# Patient Record
Sex: FEMALE | Race: ASIAN | Employment: OTHER | URBAN - METROPOLITAN AREA
[De-identification: names, ages, dates, MRNs, and addresses within clinical notes are randomized per-mention and may not be internally consistent; named-entity substitution may affect disease eponyms.]

---

## 2018-12-17 ENCOUNTER — HOSPITAL ENCOUNTER (EMERGENCY)
Facility: HOSPITAL | Age: 73
Discharge: HOME | End: 2018-12-17
Payer: MEDICARE

## 2018-12-17 ENCOUNTER — APPOINTMENT (OUTPATIENT)
Dept: GENERAL RADIOLOGY | Facility: HOSPITAL | Age: 73
End: 2018-12-17
Attending: PHYSICIAN ASSISTANT
Payer: MEDICARE

## 2018-12-17 VITALS
HEIGHT: 63 IN | HEART RATE: 67 BPM | DIASTOLIC BLOOD PRESSURE: 76 MMHG | TEMPERATURE: 98 F | RESPIRATION RATE: 16 BRPM | SYSTOLIC BLOOD PRESSURE: 118 MMHG | OXYGEN SATURATION: 97 % | WEIGHT: 113 LBS | BODY MASS INDEX: 20.02 KG/M2

## 2018-12-17 DIAGNOSIS — S93.602A SPRAIN OF LEFT FOOT, INITIAL ENCOUNTER: Primary | ICD-10-CM

## 2018-12-17 PROCEDURE — 73630 X-RAY EXAM OF FOOT: CPT | Performed by: PHYSICIAN ASSISTANT

## 2018-12-17 PROCEDURE — 99283 EMERGENCY DEPT VISIT LOW MDM: CPT

## 2018-12-17 RX ORDER — IBUPROFEN 600 MG/1
600 TABLET ORAL EVERY 8 HOURS PRN
Qty: 30 TABLET | Refills: 0 | Status: SHIPPED | OUTPATIENT
Start: 2018-12-17 | End: 2018-12-24

## 2018-12-18 NOTE — ED PROVIDER NOTES
Patient Seen in: [facility] Emergency Department    History   Patient presents with:  Lower Extremity Injury (musculoskeletal)    Stated Complaint: left ankle injury    HPI    76-year-old female who comes in today complaining of left foot pain that resistance. Normal sensation. Normal cap refill. Normal dorsalis pedis and anterior tibial pulses.  Negative anterior drawer test. Neg squeeze test. Negative proximal fibula tenderness      ED Course   Labs Reviewed - No data to display       Xr Foot, Compl expectations, follow up, and return to the ER precautions. I explained to the patient that emergent conditions may arise to return to the immediate care or ER for new, worsening or any persistent conditions.   I've explained the importance of following up

## 2019-05-17 ENCOUNTER — HOSPITAL ENCOUNTER (OUTPATIENT)
Dept: PHYSICAL THERAPY | Facility: HOSPITAL | Age: 74
Setting detail: THERAPIES SERIES
Discharge: HOME | End: 2019-05-17
Attending: ORTHOPAEDIC SURGERY
Payer: MEDICARE

## 2019-05-17 DIAGNOSIS — M25.532 PAIN IN LEFT WRIST: ICD-10-CM

## 2019-05-17 PROCEDURE — 97162 PT EVAL MOD COMPLEX 30 MIN: CPT

## 2019-05-17 NOTE — PROGRESS NOTES
PHYSICAL THERAPY UPPER EXTREMITY EVALUATION   Referring Physician: Dr. Prabhjot Gunn  Diagnosis: Pain in left wrist (F49.373) S/P Left Wrist ORIF 5/3/2019     Date of Service: 5/17/2019     PATIENT SUMMARY   Blake Lowery is a 68year old y/o female who presen use her left upper extremity for cooking, cleaning, and dressing. Lucy Jesus is a good candidate for physical therapy.   Isiah Lakhani would benefit from skilled Physical Therapy to address the above impairments to improve range of motion, improve strength, and decreas for 2-3 x/week or a total of 8 visits over a 90 day period.   Treatment will include: Manual Therapy, Therapeutic Exercise, Therapeutic Activity, Ultrasound, Electrical Stim, Neuromuscular Re-education    Education or treatment limitation: None  Rehab Maurilio

## 2019-05-21 ENCOUNTER — HOSPITAL ENCOUNTER (OUTPATIENT)
Dept: PHYSICAL THERAPY | Facility: HOSPITAL | Age: 74
Setting detail: THERAPIES SERIES
Discharge: HOME | End: 2019-05-21
Attending: ORTHOPAEDIC SURGERY
Payer: MEDICARE

## 2019-05-21 PROCEDURE — 97140 MANUAL THERAPY 1/> REGIONS: CPT

## 2019-05-21 PROCEDURE — 97110 THERAPEUTIC EXERCISES: CPT

## 2019-05-21 NOTE — PROGRESS NOTES
Dx: Pain in left wrist (M25.532) S/P Left Wrist ORIF 5/3/2019          Insurance (Authorized # of Visits):  8 ([address] [facility] [address])          Authorizing Physician: Dr. Florinda Peralta  Next MD visit: none scheduled  Fall Risk: standard         Precautions: n/a             Subjec least 25 degrees of wrist flexion AROM on the left side to increase ease with lifting objects  4.  The patient will be independent and adherent in a comprehensive HEP    Plan: add placing round discs on pegs  Date: 5/21/2019  TX#: 2/8 Date:

## 2019-05-22 ENCOUNTER — APPOINTMENT (OUTPATIENT)
Dept: PHYSICAL THERAPY | Facility: HOSPITAL | Age: 74
End: 2019-05-22
Attending: ORTHOPAEDIC SURGERY
Payer: MEDICARE

## 2019-05-24 ENCOUNTER — HOSPITAL ENCOUNTER (OUTPATIENT)
Dept: PHYSICAL THERAPY | Facility: HOSPITAL | Age: 74
Setting detail: THERAPIES SERIES
Discharge: HOME | End: 2019-05-24
Attending: ORTHOPAEDIC SURGERY
Payer: MEDICARE

## 2019-05-24 PROCEDURE — 97140 MANUAL THERAPY 1/> REGIONS: CPT

## 2019-05-24 PROCEDURE — 97110 THERAPEUTIC EXERCISES: CPT

## 2019-05-24 NOTE — PROGRESS NOTES
Dx: Pain in left wrist (M25.532) S/P Left Wrist ORIF 5/3/2019          Insurance (Authorized # of Visits):  8 ([address] [facility] [address])          Authorizing Physician: Dr. Ian Velazquez  Next MD visit: none scheduled  Fall Risk: standard         Precautions: n/a             Subjec wrist flexion/extension  Date: 5/21/2019  TX#: 2/8 Date: 5/24/2019             TX#: 3/8 Date:                 TX#: 4/ Date:                 TX#: 5/ Date:    Tx#: 6/   Manual Therapy:  STM to forearm musculature for pain reduction (3 min)  Gentle radial head

## 2019-05-28 ENCOUNTER — HOSPITAL ENCOUNTER (OUTPATIENT)
Dept: PHYSICAL THERAPY | Facility: HOSPITAL | Age: 74
Setting detail: THERAPIES SERIES
Discharge: HOME | End: 2019-05-28
Attending: FAMILY MEDICINE
Payer: MEDICARE

## 2019-05-28 PROCEDURE — 97140 MANUAL THERAPY 1/> REGIONS: CPT

## 2019-05-28 PROCEDURE — 97110 THERAPEUTIC EXERCISES: CPT

## 2019-05-29 NOTE — PROGRESS NOTES
Dx: Pain in left wrist (M25.532) S/P Left Wrist ORIF 5/3/2019          Insurance (Authorized # of Visits):  8 ([address])          Authorizing Physician: Dr. Ernesto Salinas MD visit: none scheduled  Fall Risk: standard         Precautions: n/a             Subject flex bar strengthening  Date: 5/21/2019  TX#: 2/8 Date: 5/24/2019             TX#: 3/8 Date: 5/28/2019             TX#: 4/8 Date:                 TX#: 5/ Date:    Tx#: 6/   Manual Therapy:  STM to forearm musculature for pain reduction (3 min)  Gentle radia

## 2019-05-30 ENCOUNTER — HOSPITAL ENCOUNTER (OUTPATIENT)
Dept: PHYSICAL THERAPY | Facility: HOSPITAL | Age: 74
Setting detail: THERAPIES SERIES
Discharge: HOME | End: 2019-05-30
Attending: FAMILY MEDICINE
Payer: MEDICARE

## 2019-05-30 PROCEDURE — 97140 MANUAL THERAPY 1/> REGIONS: CPT

## 2019-05-30 PROCEDURE — 97110 THERAPEUTIC EXERCISES: CPT

## 2019-05-30 NOTE — PROGRESS NOTES
Dx: Pain in left wrist (M25.532) S/P Left Wrist ORIF 5/3/2019          Insurance (Authorized # of Visits):  8 ([address] [facility] [address])          Authorizing Physician: Dr. Mayra Chatman Next MD visit: none scheduled  Fall Risk: standard         Precautions: n/a             Subject increase ease with lifting objects  4.  The patient will be independent and adherent in a comprehensive HEP    Plan: perform clip pinching  Date: 5/21/2019  TX#: 2/8 Date: 5/24/2019             TX#: 3/8 Date: 5/28/2019             TX#: 4/8 Date:  5/30/2019 flexion/extension 10x  Making fist and finger extension 10x  Picking up small metal pegs and putting in holes 6x with L UE  Placing colorful pegs in holes 2x5 on L  Squeezing yellow putty to improve  strength 10x  Pressing yellow putty with fingers to

## 2019-06-04 ENCOUNTER — HOSPITAL ENCOUNTER (OUTPATIENT)
Dept: PHYSICAL THERAPY | Facility: HOSPITAL | Age: 74
Setting detail: THERAPIES SERIES
Discharge: HOME | End: 2019-06-04
Attending: FAMILY MEDICINE
Payer: MEDICARE

## 2019-06-04 PROCEDURE — 97140 MANUAL THERAPY 1/> REGIONS: CPT

## 2019-06-04 PROCEDURE — 97110 THERAPEUTIC EXERCISES: CPT

## 2019-06-04 NOTE — PROGRESS NOTES
Dx: Pain in left wrist (M25.532) S/P Left Wrist ORIF 5/3/2019          Insurance (Authorized # of Visits):  8 ([address])          Authorizing Physician: Dr. Slick Funez Next MD visit: none scheduled  Fall Risk: standard         Precautions: n/a             Subject and adherent in a comprehensive HEP    Plan: perform reassessment  Date: 5/21/2019  TX#: 2/8 Date: 5/24/2019             TX#: 3/8 Date: 5/28/2019             TX#: 4/8 Date:  5/30/2019            TX#: 5/8 Date: 6/4/2019  Tx#: 6/8   Manual Therapy:  JOHNSON to deny pegs)  Wrist slides to improve flexion ROM 10x  Picking coins up and placing in slot, 10x (added to HEP) There ex:   Thumb flexion/extension 10x  Making fist and finger extension 10x  Picking up small metal pegs and putting in holes 6x with L UE  Placing c

## 2019-06-06 ENCOUNTER — HOSPITAL ENCOUNTER (OUTPATIENT)
Dept: PHYSICAL THERAPY | Facility: HOSPITAL | Age: 74
Setting detail: THERAPIES SERIES
Discharge: HOME | End: 2019-06-06
Attending: FAMILY MEDICINE
Payer: MEDICARE

## 2019-06-06 PROCEDURE — 97110 THERAPEUTIC EXERCISES: CPT

## 2019-06-06 NOTE — PROGRESS NOTES
Dx: Pain in left wrist (M25.532) S/P Left Wrist ORIF 5/3/2019          Insurance (Authorized # of Visits):  8 ([address])          Authorizing Physician: Dr. Nalini Ware Next MD visit: none scheduled  Fall Risk: standard         Precautions: n/a              Tha Keyes her surgeon after her trip, and if further physical therapy is warranted, the patient will need a new physical therapy order. Goals: To be met in 8 visits  1. The patient will report being able to dress herself while using her left upper extremity.  MET Therapy:  Gentle radial head mobilizations grade II to improve pronation/supination ROM (2 min)  Wrist PROM into flexion/extension (3 min)  Thumb flexion/extension PROM (3 min) Manual Therapy:  Gentle radial head mobilizations grade II to improve pronation 10x ea  Finger extension with yellow rubber band, 5x  Placing small pegs in small holes, 20 on L  Wrist extension/flexion against gravity with no resistance 2x10  Ulnar/radial deviation 10x ea    X X X X X X   HEP: active range of motion of fingers, forear

## 2019-08-29 ENCOUNTER — ORDER TRANSCRIPTION (OUTPATIENT)
Dept: PHYSICAL THERAPY | Facility: HOSPITAL | Age: 74
End: 2019-08-29

## 2019-08-29 DIAGNOSIS — M25.532 LEFT WRIST PAIN: Primary | ICD-10-CM

## 2019-09-03 NOTE — PROGRESS NOTES
ELBOW AND HAND EVALUATION:   Referring Physician: Ms. Thomas Husbands  Diagnosis: Left wrist pain (M25.532)        Date of Service: 9/3/2019     PATIENT SUMMARY   Marely Manning is a 68year old female who presents to therapy today with complaints of to 50% compared to contralateral side. Functional deficits include but are not limited to cooking, cleaning, self care management of hair. Signs and symptoms are consistent with referring diagnosis.  Pt and PT discussed evaluation findings, pathology, POC education , detrimental fear avoidance behaviors and importance of remaining active. Discussed use of ice if soreness following increased activity. Discussed use of self massage to region of pain as long as continued relief.  Discussed continued use of spo your referral. Please co-sign or sign and return this letter via fax as soon as possible to [phone].  If you have any questions, please contact me at Dept: [phone]    Sincerely,  Electronically signed by therapist: Jose Francisco Teran PT  Physician's cer

## 2019-09-04 ENCOUNTER — HOSPITAL ENCOUNTER (OUTPATIENT)
Dept: PHYSICAL THERAPY | Facility: HOSPITAL | Age: 74
Setting detail: THERAPIES SERIES
Discharge: HOME | End: 2019-09-04
Payer: MEDICARE

## 2019-09-04 DIAGNOSIS — M25.532 LEFT WRIST PAIN: ICD-10-CM

## 2019-09-04 PROCEDURE — 97162 PT EVAL MOD COMPLEX 30 MIN: CPT

## 2019-09-06 ENCOUNTER — HOSPITAL ENCOUNTER (OUTPATIENT)
Dept: PHYSICAL THERAPY | Facility: HOSPITAL | Age: 74
Setting detail: THERAPIES SERIES
Discharge: HOME | End: 2019-09-06
Payer: MEDICARE

## 2019-09-06 PROCEDURE — 97110 THERAPEUTIC EXERCISES: CPT

## 2019-09-06 PROCEDURE — 97140 MANUAL THERAPY 1/> REGIONS: CPT

## 2019-09-06 NOTE — PROGRESS NOTES
Dx: Left wrist pain (M25.532)            Insurance (Authorized # of Visits): Medicare 8          Authorizing Physician: Dr. Cindy Ly  Next MD visit: none scheduled  Fall Risk: standard         Precautions: n/a             Subjective: Reports mild pain at score to 60/100 or greater to demonstrate overall improvement in function  · Patient will be independent and compliant with HEP      Plan: progress wrist ROM into normal range, progress wrist and  strengths; all per original POC  Date: 9/5/2019  TX#: 2

## 2019-09-09 ENCOUNTER — HOSPITAL ENCOUNTER (OUTPATIENT)
Dept: PHYSICAL THERAPY | Facility: HOSPITAL | Age: 74
Setting detail: THERAPIES SERIES
Discharge: HOME | End: 2019-09-09
Payer: MEDICARE

## 2019-09-09 PROCEDURE — 97110 THERAPEUTIC EXERCISES: CPT

## 2019-09-09 PROCEDURE — 97140 MANUAL THERAPY 1/> REGIONS: CPT

## 2019-09-09 NOTE — PROGRESS NOTES
Dx: Left wrist pain (M25.532)            Insurance (Authorized # of Visits):  Medicare 8           Authorizing Physician: Dr. Nasima Beckman  Next MD visit: none scheduled  Fall Risk: standard         Precautions: n/a             Subjective: No particular exercis will improve FOTO score to 60/100 or greater to demonstrate overall improvement in function  · Patient will be independent and compliant with HEP      Plan: progress wrist ROM into normal range, progress wrist and  strengths; all per original POC; putt

## 2019-09-11 ENCOUNTER — HOSPITAL ENCOUNTER (OUTPATIENT)
Dept: PHYSICAL THERAPY | Facility: HOSPITAL | Age: 74
Setting detail: THERAPIES SERIES
Discharge: HOME | End: 2019-09-11
Payer: MEDICARE

## 2019-09-11 PROCEDURE — 97110 THERAPEUTIC EXERCISES: CPT

## 2019-09-11 PROCEDURE — 97140 MANUAL THERAPY 1/> REGIONS: CPT

## 2019-09-11 NOTE — PROGRESS NOTES
Dx: Left wrist pain (M25.532)            Insurance (Authorized # of Visits): Medicare 8          Authorizing Physician: Dr. Nichole Vasquez  Next MD visit: none scheduled  Fall Risk: standard         Precautions: n/a             Subjective: Was doing some work and flexion AROM, ext AROM; lateral pinch 2s x 10; 5s sponge x 10, pron/sup with small hammer 2 x 5 ea    Goals:  (to be met in 10 visits)   · Patient will improve wrist flexion to 70 to improve function with grace murphy (improving)  · Patient will impro placement ring for pinch  x yellow, green and red  -flex bar flexion x 10 x 2, ext x 10 x 2 red thin  - squeeze on stress ball 5s x 10 x 2, 5s rest TE:  -rad dev AROM 1# holds x 10 x 2  -flex AROM 1# x 10 x 2  -pronation/supination hammer 8 ea x 2

## 2019-09-16 ENCOUNTER — HOSPITAL ENCOUNTER (OUTPATIENT)
Dept: PHYSICAL THERAPY | Facility: HOSPITAL | Age: 74
Setting detail: THERAPIES SERIES
Discharge: HOME | End: 2019-09-16
Payer: MEDICARE

## 2019-09-16 PROCEDURE — 97110 THERAPEUTIC EXERCISES: CPT

## 2019-09-16 PROCEDURE — 97140 MANUAL THERAPY 1/> REGIONS: CPT

## 2019-09-16 NOTE — PROGRESS NOTES
Dx: Left wrist pain (M25.532)            Insurance (Authorized # of Visits):  Medicare 8            Authorizing Physician: Dr. Lenore Yates  Next MD visit: none scheduled  Fall Risk: standard         Precautions: n/a             Subjective: Reports feeling sore Instructed in and issued handouts for: 5s x 10 radial deviation AROM, flexion AROM, ext AROM; lateral pinch 2s x 10; 5s sponge x 10, pron/sup with small hammer 2 x 5 ea    Goals:  (to be met in 10 visits)   · Patient will improve wrist flexion to 70 to ea x 2  -sponge lateral pinch 2s x 10  -sponge squeeze 5s x 10  -clothespin placement ring for pinch  x green and red TE:  -rad dev AROM 1# holds x 10 x 2  -standing rad dev 1# x 12 x 2  -flex AROM 1# x 10 x 2  -ext AROM 1#  10 x 2  -pronation/supinati

## 2019-09-18 ENCOUNTER — HOSPITAL ENCOUNTER (OUTPATIENT)
Dept: PHYSICAL THERAPY | Facility: HOSPITAL | Age: 74
Setting detail: THERAPIES SERIES
Discharge: HOME | End: 2019-09-18
Payer: MEDICARE

## 2019-09-18 PROCEDURE — 97140 MANUAL THERAPY 1/> REGIONS: CPT

## 2019-09-18 PROCEDURE — 97110 THERAPEUTIC EXERCISES: CPT

## 2019-09-18 NOTE — PROGRESS NOTES
Dx: Left wrist pain (M25.532)            Insurance (Authorized # of Visits):  Medicare 8             Authorizing Physician: Dr. Chiqui Ayala Next MD visit: none scheduled  Fall Risk: standard         Precautions: n/a             Subjective: Feeling okay today  strength devices into session with good tolerance. Discussion and demo with pictures for lateral pinch  with putty for home.  Mar Parent would benefit from continued PT to address continued limitations in wrist strength and ROM as well as  and pi with slight distraction   -ulnar carpal glide with movement into radial deviation x 10 G3 Manual:   -STM wrist extensors with PROM flexion  -dorsal glide for wrist flexion   -PROM flexion/ext x 10 ea with slight distraction   -ulnar carpal glide with movem ea  -scoop rice into measuring cup x 1 liter x 2  -wrist roll on velcro with pinch  x 3 ea way  -wrist roll small cylinder x 3 ea way  -flex bar flexion x 10 x 2, ext x 10 x 2 red thin              Charges: Manual x 1 (12'); TE x 3 (43')       Total Ti

## 2019-09-19 ENCOUNTER — TELEPHONE (OUTPATIENT)
Dept: PHYSICAL THERAPY | Facility: HOSPITAL | Age: 74
End: 2019-09-19

## 2019-09-23 ENCOUNTER — HOSPITAL ENCOUNTER (OUTPATIENT)
Dept: PHYSICAL THERAPY | Facility: HOSPITAL | Age: 74
Setting detail: THERAPIES SERIES
Discharge: HOME | End: 2019-09-23
Payer: MEDICARE

## 2019-09-23 PROCEDURE — 97110 THERAPEUTIC EXERCISES: CPT

## 2019-09-23 PROCEDURE — 97140 MANUAL THERAPY 1/> REGIONS: CPT

## 2019-09-23 NOTE — PROGRESS NOTES
Dx: Left wrist pain (M25.532)            Insurance (Authorized # of Visits): Medicare 8            Authorizing Physician: Dr. Hossein Hamilton Next MD visit: October 3rd, 2019  Fall Risk: standard         Precautions: n/a             Subjective: Feeling good toda with attention to task.      Instructed in and issued handouts for: 5s x 10 radial deviation AROM, flexion AROM, ext AROM (all AROM upgraded to holding can soda or soup can); lateral pinch 2s x 10; 5s sponge x 10, pron/sup with small hammer 2 x 5 ea; Mehul Mahajan x 10 G3 Manual:   -dorsal glide for wrist flexion   -PROM flexion/ext x 10 ea with slight distraction   -ulnar carpal glide with movement into radial deviation x 10 G3 Manual:   -dorsal glide for wrist flexion   -PROM flexion x 10 ea with slight distractio cylinder x 3 ea way  -flex bar flexion x 10 x 2, ext x 10 x 2 red thin TE:   -ROM re-assessment  -wrist flexion 2# 2 x 12  -radial deviation at side hammer 2 x 12  -pron/sup small hammer x 10 ea way x 2  -5#  tool 12x 3s holds  -6#  x 15 x 2  -clot

## 2019-09-25 ENCOUNTER — HOSPITAL ENCOUNTER (OUTPATIENT)
Dept: PHYSICAL THERAPY | Facility: HOSPITAL | Age: 74
Setting detail: THERAPIES SERIES
Discharge: HOME | End: 2019-09-25
Payer: MEDICARE

## 2019-09-25 PROCEDURE — 97110 THERAPEUTIC EXERCISES: CPT

## 2019-09-25 PROCEDURE — 97140 MANUAL THERAPY 1/> REGIONS: CPT

## 2019-09-25 NOTE — PROGRESS NOTES
Dx: Left wrist pain (M25.532)            Insurance (Authorized # of Visits):  Medicare 8             Authorizing Physician: Dr. Nalini Ware Next MD visit: October 3rd, 2019  Fall Risk: standard         Precautions: n/a             Subjective: Wrist is feelin with twisting out water from towel, lifting pots and pans with items in them.      Instructed in and issued handouts for: 5s x 10 radial deviation AROM, flexion AROM, ext AROM (all AROM upgraded to holding can soda or soup can); lateral pinch 2s x 10; 5s sp deviation x 10 G3 Manual:   -dorsal glide for wrist flexion   -PROM flexion x 10 ea with slight distraction   -ulnar carpal glide with movement into radial deviation x 10 G3 Manual:   -dorsal glide for wrist flexion   -PROM flexion x 10 ea with slight dist holds  -6#  x 15 x 2  -clothespin placement for pinch  green, red, blue (1st and second digit )  -scoop rice into measuring cup x 1 liter x 2  -wrist roll on velcro with pinch  x 3 ea way  -wrist roll small cylinder x 1ea way   TE:   -cloth

## 2019-09-30 ENCOUNTER — HOSPITAL ENCOUNTER (OUTPATIENT)
Dept: PHYSICAL THERAPY | Facility: HOSPITAL | Age: 74
Setting detail: THERAPIES SERIES
Discharge: HOME | End: 2019-09-30
Payer: MEDICARE

## 2019-09-30 PROCEDURE — 97110 THERAPEUTIC EXERCISES: CPT

## 2019-09-30 PROCEDURE — 97140 MANUAL THERAPY 1/> REGIONS: CPT

## 2019-09-30 NOTE — ADDENDUM NOTE
Encounter addended by: Rosaline Cardoso PT on: 9/30/2019 12:18 PM   Actions taken: Sign clinical note

## 2019-09-30 NOTE — ADDENDUM NOTE
Encounter addended by: Carlene Elizondo PT on: 9/30/2019 12:15 PM   Actions taken: Sign clinical note

## 2019-09-30 NOTE — ADDENDUM NOTE
Encounter addended by: Carlene Elizondo PT on: 9/30/2019 12:17 PM   Actions taken: Sign clinical note

## 2019-09-30 NOTE — ADDENDUM NOTE
Encounter addended by: Matilde Galindo PT on: 9/30/2019 12:14 PM   Actions taken: Sign clinical note

## 2019-10-02 ENCOUNTER — HOSPITAL ENCOUNTER (OUTPATIENT)
Dept: PHYSICAL THERAPY | Facility: HOSPITAL | Age: 74
Setting detail: THERAPIES SERIES
Discharge: HOME | End: 2019-10-02
Payer: MEDICARE

## 2019-10-02 ENCOUNTER — TELEPHONE (OUTPATIENT)
Dept: PHYSICAL THERAPY | Facility: HOSPITAL | Age: 74
End: 2019-10-02

## 2019-10-02 PROCEDURE — 97140 MANUAL THERAPY 1/> REGIONS: CPT

## 2019-10-02 PROCEDURE — 97110 THERAPEUTIC EXERCISES: CPT

## 2019-10-02 NOTE — PROGRESS NOTES
Dx: Left wrist pain (M25.532)            Insurance (Authorized # of Visits):  Medicare 10       (6 additional requested)   Authorizing Physician: Dr. Parag Craig Next MD visit: October 3rd, 2019  Fall Risk: standard         Precautions: n/a           Pt ha 5/5; L 4*/5       Assessment: Improvement to radial deviation noted. No change since last measured and still slightly limited wrist flexion to 50.  PROM flexion into gentle stretch by patient performed, reports pain at distal wrist palmar side, cues for gen movement into radial deviation x 10 G3 Manual:   -dorsal glide for wrist flexion   -PROM flexion/ext x 10 ea with slight distraction   -ulnar carpal glide with movement into radial deviation x 10 G3 Manual:   -dorsal glide for wrist flexion   -PROM flexion )  -scoop rice into measuring cup x 1 liter x 2  -wrist roll on velcro with pinch  x 3 ea way  -wrist roll small cylinder x 1ea way   TE:   -clothespin placement for pinch  green x 2, blue x 2  -radial deviation at side hammer 3 x 10  -pron/sup

## 2019-10-02 NOTE — TELEPHONE ENCOUNTER
Left message with  requesting signed POC by provider for PT evaluation and progress note for medicare sent on 9/4 and 9/30.

## 2019-10-04 ENCOUNTER — TELEPHONE (OUTPATIENT)
Dept: PHYSICAL THERAPY | Facility: HOSPITAL | Age: 74
End: 2019-10-04

## 2019-10-04 NOTE — TELEPHONE ENCOUNTER
Called to confirm reciept of re-send fax for signed evaluation for medicare. Requested sent to alternate fax [phone]. Re-sent fax.

## 2019-10-07 ENCOUNTER — HOSPITAL ENCOUNTER (OUTPATIENT)
Dept: PHYSICAL THERAPY | Facility: HOSPITAL | Age: 74
Setting detail: THERAPIES SERIES
Discharge: HOME | End: 2019-10-07
Attending: ORTHOPAEDIC SURGERY
Payer: MEDICARE

## 2019-10-07 PROCEDURE — 97140 MANUAL THERAPY 1/> REGIONS: CPT

## 2019-10-07 PROCEDURE — 97110 THERAPEUTIC EXERCISES: CPT

## 2019-10-07 NOTE — PROGRESS NOTES
Dx: Left wrist pain (M25.532)            Insurance (Authorized # of Visits):  Medicare 12  Authorizing Physician: Dr. Prabhjot Salinas MD visit: early November 2019  Fall Risk: standard         Precautions: n/a             Subjective: Saw surgeon last week wrist flexion, supin/pron hammer, putty squeeze, key pinch, wall push up  Access Code: [email or web address] ([email or web address])    Goals:  (to be met in 10 visits)   · Patient will improve wrist flexion to 60 (downgraded to WNL due to progress) to improve function with donnin glide for wrist flexion   -PROM flexion x 10 ea with slight distraction   -ulnar carpal glide with movement into radial deviation x 10 G3  -STM extensors of wrist, ext and add hallicus end of session   TE:   -re-assessment  strength  -wrist extension 2 10  -RTB wrist flexion x 10  -ROM re-assessment TE:   UBE 2 fwd/2 retro, 1.5 level  -wall push up 2 x 12  -wrist ABCs 1# ball in radial deviation position  -red flex bar 3 x 8 radial deviation  - squeeze green band 6 x 3  --clothespin placement for pin

## 2019-10-14 ENCOUNTER — HOSPITAL ENCOUNTER (OUTPATIENT)
Dept: PHYSICAL THERAPY | Facility: HOSPITAL | Age: 74
Setting detail: THERAPIES SERIES
Discharge: HOME | End: 2019-10-14
Attending: FAMILY MEDICINE
Payer: MEDICARE

## 2019-10-14 PROCEDURE — 97140 MANUAL THERAPY 1/> REGIONS: CPT

## 2019-10-14 PROCEDURE — 97110 THERAPEUTIC EXERCISES: CPT

## 2019-10-14 NOTE — PROGRESS NOTES
Dx: Left wrist pain (M25.532)            Insurance (Authorized # of Visits):  Medicare 12  Authorizing Physician: Dr. Freddie Salinas MD visit: early November 2019  Fall Risk: standard         Precautions: n/a             Subjective: Reports exercise comp third digit assist. Will continue to progress within tolerance.        Instructed in and issued handouts for: RTB radial deviation, RTB wrist flexion, supin/pron hammer, putty squeeze, key pinch, couch push up  Access Code: Marianela Hays ([email or web address])    Goals:  ( -PROM flexion x 10 ea with slight distraction   -ulnar carpal glide with movement into radial deviation x 10 G3  -STM extensors of wrist, ext and add hallicus end of session Manual:   -dorsal glide for wrist flexion   -PROM flexion x 10 ea with slight di level  Modified Push up max mat table height 2 x 10  -wrist ABCs 1# ball in radial deviation position x 2  --clothespin placement for pinch  green x 2, blue x 2  -3 x 8 7# digi flex  -red flex bar 3 x 8 radial deviation  -radial deviation hammer small

## 2019-10-15 NOTE — PROGRESS NOTES
Dx: Left wrist pain (M25.532)            Insurance (Authorized # of Visits):  Medicare 12  Authorizing Physician: Dr. Saji Ventura Next MD visit: early November 2019  Fall Risk: standard         Precautions: n/a             Subjective: Wrist feels about the maintenance. Describes as \"some soreness, but no pain\". Added this task to HEP. Reports light, general arm soreness and wrist soreness following session.        Instructed in and issued handouts for: RTB radial deviation, RTB wrist flexion, supin/pron ham 10 ea with slight distraction   -ulnar carpal glide with movement into radial deviation x 10 G3  -STM extensors of wrist, ext and add hallicus end of session Manual:   -dorsal glide for wrist flexion   -PROM flexion x 10 ea with slight distraction   -ulnar small at side 3 x 12  - squeeze Y and R band 3 x 8   -flexbar R 2 x 12 flex and ext ea   TE:   UBE 2 fwd/2 retro, 3.3 level  -  and pinch strength assessments  Modified Push up max mat table height 2 x 10  --clothespin placement for pinch  vazqueze

## 2019-10-16 ENCOUNTER — HOSPITAL ENCOUNTER (OUTPATIENT)
Dept: PHYSICAL THERAPY | Facility: HOSPITAL | Age: 74
Setting detail: THERAPIES SERIES
Discharge: HOME | End: 2019-10-16
Payer: MEDICARE

## 2019-10-16 PROCEDURE — 97140 MANUAL THERAPY 1/> REGIONS: CPT

## 2019-10-16 PROCEDURE — 97110 THERAPEUTIC EXERCISES: CPT

## 2019-10-25 ENCOUNTER — HOSPITAL ENCOUNTER (OUTPATIENT)
Dept: PHYSICAL THERAPY | Facility: HOSPITAL | Age: 74
Setting detail: THERAPIES SERIES
Discharge: HOME | End: 2019-10-25
Payer: MEDICARE

## 2019-10-25 PROCEDURE — 97110 THERAPEUTIC EXERCISES: CPT

## 2019-10-25 NOTE — PROGRESS NOTES
Dx: Left wrist pain (M25.532)            Insurance (Authorized # of Visits):  Medicare 12  Authorizing Physician: Dr. Mayra Chatman Next MD visit: early November 2019  Fall Risk: standard         Precautions: n/a             Subjective: Reports sometimes sti last session. Improvement in strength reflected above as well. 4+/5 strength at wrist flexors and radial deviators with no longer pain with resisted wrist flexion but mild symptoms with radial deviation.  Patient still deviating to midline with push-up from 10/16/19  13 10/25/2019  14   Manual:   -dorsal glide for wrist flexion   -PROM flexion x 10 ea with slight distraction   -ulnar carpal glide with movement into radial deviation x 10 G3  -STM extensors of wrist, ext and add hallicus Manual:   -dorsal glide retro, 3.3 level  -re-assessment strength and range  -AP on BB closed chain standing 30s x 2  -modified tricep push up on 1 foam with mirror visual feedback 3 x 10  -radial deviation standing large hammer 3s x 10 x 2  -flexbar red flex/ext large red x 10 e

## 2019-10-29 ENCOUNTER — TELEPHONE (OUTPATIENT)
Dept: PHYSICAL THERAPY | Facility: HOSPITAL | Age: 74
End: 2019-10-29

## 2019-10-29 ENCOUNTER — APPOINTMENT (OUTPATIENT)
Dept: PHYSICAL THERAPY | Facility: HOSPITAL | Age: 74
End: 2019-10-29
Payer: MEDICARE

## 2019-11-07 NOTE — PROGRESS NOTES
Progress Summary    Dx: Left wrist pain (M25.532)            Insurance (Authorized # of Visits):  Medicare 12  Authorizing Physician: Dr. Rowan Edouard Next MD visit: early November 2019  Fall Risk: standard         Precautions: n/a     Pt has attended 15 vi R 5/5; L 4*/5  Extension: R 5/5; L 4+/5  Ulnar Deviation: R 5/5; L 4+/5  Radial Deviation R 5/5; L 4*/5       Assessment: Ivana Robbins has completed 15 visits of PT following [place] trip to [place].  Prior to this, patient had completed overall improvement in function (met)  · Patient will be independent and compliant with HEP (in progress)      Plan: Continue skilled Physical Therapy 1 x/week or a total of 16 (1 additional) visits over a 90 day period.  Treatment will include: therapeutic level  -wall push up 2 x 10  -wrist ABD ABC with 3# ball wrist extension position  -radial deviation position 1-10 draw 3# ball  -RTB radial deviation 3s x 10  -RTB wrist flexion x 10  -ROM re-assessment TE:   UBE 2 fwd/2 retro, 1.5 level  -wall push up 2 45 min

## 2019-11-08 ENCOUNTER — HOSPITAL ENCOUNTER (OUTPATIENT)
Dept: PHYSICAL THERAPY | Facility: HOSPITAL | Age: 74
Setting detail: THERAPIES SERIES
Discharge: HOME | End: 2019-11-08
Attending: FAMILY MEDICINE
Payer: MEDICARE

## 2019-11-08 PROCEDURE — 97110 THERAPEUTIC EXERCISES: CPT

## 2019-11-15 ENCOUNTER — HOSPITAL ENCOUNTER (OUTPATIENT)
Dept: PHYSICAL THERAPY | Facility: HOSPITAL | Age: 74
Setting detail: THERAPIES SERIES
Discharge: HOME | End: 2019-11-15
Attending: FAMILY MEDICINE
Payer: MEDICARE

## 2019-11-15 PROCEDURE — 97110 THERAPEUTIC EXERCISES: CPT

## 2019-11-15 NOTE — PROGRESS NOTES
Discharge Summary  Dx: Left wrist pain (M25.532)            Insurance (Authorized # of Visits):  Medicare 12  Authorizing Physician: Dr. Pearl Watts Next MD visit: March 2020  Fall Risk: standard         Precautions: n/a     Pt has attended 16 visits in Patient feels she is 70% improved since beginning PT. Patient has reached a plateau with her strength and ROM of L wrist. Patient reports focused on increased use of wrist over last week and improved function with good tolerance.   strength to 19# this 10/16/19  13 10/25/2019  14 11/8/19  15 11/15/19  17   Manual:   -dorsal glide for wrist flexion   -PROM flexion x 10 ea with slight distraction   -ulnar carpal glide with movement into radial deviation x 10 G3 Manual:   -dorsal glide for wrist flexion MW 10  -hammer radial deviation 2 x 12  -hammer curl 5# 2 x 10  -radial deviation ABCs 1# x 2  -rice scoop to fill 2 cups and then rice pour x 4  -red flex bar 20 ea x 2  -red flexbar radial deviation 2 x 8              Charges:  TE x 3 (39')       Total Time

## 2021-04-30 ENCOUNTER — IMMUNIZATION (OUTPATIENT)
Dept: LAB | Age: 76
End: 2021-04-30
Attending: HOSPITALIST
Payer: MEDICARE

## 2021-04-30 DIAGNOSIS — Z23 NEED FOR VACCINATION: Primary | ICD-10-CM

## 2021-04-30 PROCEDURE — 0001A SARSCOV2 VAC 30MCG/0.3ML IM: CPT

## 2021-05-21 ENCOUNTER — IMMUNIZATION (OUTPATIENT)
Dept: LAB | Age: 76
End: 2021-05-21
Attending: HOSPITALIST
Payer: MEDICARE

## 2021-05-21 DIAGNOSIS — Z23 NEED FOR VACCINATION: Primary | ICD-10-CM

## 2021-05-21 PROCEDURE — 0002A SARSCOV2 VAC 30MCG/0.3ML IM: CPT

## 2021-12-08 ENCOUNTER — IMMUNIZATION (OUTPATIENT)
Dept: LAB | Facility: HOSPITAL | Age: 76
End: 2021-12-08
Attending: EMERGENCY MEDICINE
Payer: MEDICARE

## 2021-12-08 DIAGNOSIS — Z23 NEED FOR VACCINATION: Primary | ICD-10-CM

## 2021-12-08 PROCEDURE — 0004A SARSCOV2 VAC 30MCG/0.3ML IM: CPT

## 2023-04-17 ENCOUNTER — IMMUNIZATION (OUTPATIENT)
Dept: LAB | Age: 78
End: 2023-04-17
Attending: EMERGENCY MEDICINE
Payer: MEDICARE

## 2023-04-17 DIAGNOSIS — Z23 NEED FOR VACCINATION: Primary | ICD-10-CM

## 2023-04-17 PROCEDURE — 0124A SARSCOV2 VAC BVL 30MCG/0.3ML: CPT

## (undated) NOTE — ED AVS SNAPSHOT
Winsome Saldaña   MRN: [id number]    Department:  [facility] Emergency Department   Date of Visit:  12/17/2018           Disclosure     Insurance plans vary and the physician(s) referred by the ER may not be covered by your plan.  Please contact your tell this physician (or your personal doctor if your instructions are to return to your personal doctor) about any new or lasting problems. The primary care or specialist physician will see patients referred from the [facility] Emergency Department.  Fanta Hunter

## (undated) NOTE — LETTER
Patient Name: Glen Wei  YOB: 1945          MRN number:  [id number]  Date:  9/30/2019  Referring Physician:  Ms. Marci Lama Summary  Dx: Left wrist pain (M25.532)            Insurance (Authorized # of Visits):  Medicare 10 Extension(0-70/80): R 75, L 55  Radial Deviation (0-20):R 40;  L20  Ulnar Deviation (0-30):R 50; L 30      Strength/MMT: (* denotes performed with pain)   Elbow Wrist   Flexion: R 5/5; L 4/5  Extension: R 5/5; L 5/5  Supination: R 5/5; L 4/5  Pronation: R · Patient will improve wrist flexion to 70 to improve function with donning deoderant (improving)  · Patient will improve wrist extension to 60 improve function with hair management (met)  · Patient will improve radial deviation strength to 4/5 to improve

## (undated) NOTE — LETTER
Patient Name: Jose Lim  YOB: 1945          MRN number:  [id number]  Date:  11/15/2019  Referring Physician:  Dr. Matt George     Discharge Summary  Dx: Left wrist pain (M25.532)            Insurance (Authorized # of Visits):  Medicare 16  A visits of PT from 5/20/19 to 6/07/19 following ORIF L wrist on 5/3/2019 after a fall. Martine Mayen FOTO score has improved from 43/100 at evaluation to 69/100 last session. Patient feels she is 70% improved since beginning PT.  Patient has reached a plateau w Sincerely,  Electronically signed by therapist: Arleen Britton PT     Physician's certification required:  No    Certification From: 71/0/5266  To:2/6/2020

## (undated) NOTE — LETTER
Patient Name: Saira Vera  YOB: 1945          MRN number:  [id number]  Date:  11/8/2019  Referring Physician:  Dr. Dagoberto Bustillos Summary    Dx: Left wrist pain (M25.532)            Insurance (Authorized # of Visits):  Medicare 16  A Strength/MMT: (* denotes performed with pain)   Elbow Wrist   Flexion: R 5/5; L 4+/5  Extension: R 5/5; L 5/5  Supination: R 5/5; L 4/5  Pronation: R 5/5; L 5/5 Flexion: R 5/5; L 4*/5  Extension: R 5/5; L 4+/5  Ulnar Deviation: R 5/5; L 4+/5  Radial Deviat · Patient will improve  strength to 15# to improve ease with lifting drink to mouth and carrying bag (in progress)  · Pt will improve FOTO score to 60/100 or greater to demonstrate overall improvement in function (met)  · Patient will be independent an

## (undated) NOTE — LETTER
Patient Name: Sea Black  YOB: 1945          MRN number:  [id number]  Date:  9/4/2019  Referring Physician:  Ms. Julio César Lambert EVALUATION:    Referring Physician: Ms. Andrea Watkins  Diagnosis: Left wrist pain (M25.532 motion; reduced L wrist flexion to 30 and extension to 55; global impairment of strength of wrist with mild pain to resisted testing; increased tension wrist extensors; impaired  and pinch strength to 50% compared to contralateral side.   Functional def Pt education was provided on exam findings, treatment diagnosis, treatment plan, expectations, and prognosis.  Pt was also provided recommendations for activity modifications, possible soreness after evaluation, modalities as needed [ice/heat], pain science Education or treatment limitation: None  Rehab Potential:good    FOTO: 43/100    Patient/Family/Caregiver was advised of these findings, precautions, and treatment options and has agreed to actively participate in planning and for this course of care.     Madhuri Cisneros

## (undated) NOTE — LETTER
Patient Name: Bela Gilliam  YOB: 1945          MRN number:  [id number]  Date:  5/20/2019  Referring Physician:  Luis Keith           PHYSICAL THERAPY UPPER EXTREMITY EVALUATION    Referring Physician: Dr. Parag Craig  Diagnosis: Pain in le wrist fracture. The patient has some pain and decreased strength with use of the left upper extremity, which limits her ability to perform ADLs. The patinet is currently unable to use her left upper extremity for cooking, cleaning, and dressing.  Venita Fernandez i 3. The patient will demonstrate at least 25 degrees of wrist flexion AROM on the left side to increase ease with lifting objects  4.  The patient will be independent and adherent in a comprehensive HEP    Frequency / Duration: Patient will be seen for 2-3 x

## (undated) NOTE — LETTER
Patient Name: Breonna Friend  YOB: 1945          MRN number:  [id number]  Date:  6/7/2019  Referring Physician:  Dr. Mayra Chatman     Discharge Summary  Pt has attended 7 visits in Physical Therapy. Subjective:  The patient reports that she can physical therapy order. Goals: To be met in 8 visits  1. The patient will report being able to dress herself while using her left upper extremity. MET  2. The patient will demonstrate at least 15#  strength with the left upper extremity NOT MET  3.